# Patient Record
Sex: FEMALE | Race: WHITE | NOT HISPANIC OR LATINO | URBAN - METROPOLITAN AREA
[De-identification: names, ages, dates, MRNs, and addresses within clinical notes are randomized per-mention and may not be internally consistent; named-entity substitution may affect disease eponyms.]

---

## 2017-01-13 ENCOUNTER — IMPORTED ENCOUNTER (OUTPATIENT)
Dept: URBAN - METROPOLITAN AREA CLINIC 31 | Facility: CLINIC | Age: 72
End: 2017-01-13

## 2017-01-13 PROBLEM — Z96.1: Noted: 2017-01-13

## 2017-01-13 PROCEDURE — 92014 COMPRE OPH EXAM EST PT 1/>: CPT

## 2017-01-13 PROCEDURE — 92015 DETERMINE REFRACTIVE STATE: CPT

## 2017-01-13 NOTE — PATIENT DISCUSSION
1.  Pseudophakia OU - Restor MF  IOLs. stable. Monitor. 2. Fam hx AMD and GLC-mom. 3. Getting rx suns dist- VSP4. RTN 1 yr [employer] in [facility].

## 2017-02-13 ENCOUNTER — APPOINTMENT (RX ONLY)
Dept: URBAN - METROPOLITAN AREA CLINIC 126 | Facility: CLINIC | Age: 72
Setting detail: DERMATOLOGY
End: 2017-02-13

## 2017-02-13 DIAGNOSIS — H01.13 ECZEMATOUS DERMATITIS OF EYELID: ICD-10-CM

## 2017-02-13 DIAGNOSIS — B07.8 OTHER VIRAL WARTS: ICD-10-CM

## 2017-02-13 PROBLEM — H01.139 ECZEMATOUS DERMATITIS OF UNSPECIFIED EYE, UNSPECIFIED EYELID: Status: ACTIVE | Noted: 2017-02-13

## 2017-02-13 PROBLEM — H01.134 ECZEMATOUS DERMATITIS OF LEFT UPPER EYELID: Status: ACTIVE | Noted: 2017-02-13

## 2017-02-13 PROCEDURE — ? DIAGNOSIS COMMENT

## 2017-02-13 PROCEDURE — ? PRESCRIPTION

## 2017-02-13 PROCEDURE — ? TREATMENT REGIMEN

## 2017-02-13 PROCEDURE — 99213 OFFICE O/P EST LOW 20 MIN: CPT

## 2017-02-13 PROCEDURE — ? DEFER

## 2017-02-13 PROCEDURE — ? COUNSELING

## 2017-02-13 RX ORDER — DESONIDE 0.5 MG/G
CREAM TOPICAL
Qty: 1 | Refills: 0 | Status: ERX | COMMUNITY
Start: 2017-02-13

## 2017-02-13 RX ADMIN — DESONIDE: 0.5 CREAM TOPICAL at 16:16

## 2017-02-13 ASSESSMENT — LOCATION DETAILED DESCRIPTION DERM
LOCATION DETAILED: LEFT CENTRAL MALAR CHEEK
LOCATION DETAILED: LEFT MEDIAL SUPERIOR EYELID
LOCATION DETAILED: LEFT INFERIOR TEMPLE

## 2017-02-13 ASSESSMENT — LOCATION ZONE DERM
LOCATION ZONE: FACE
LOCATION ZONE: EYELID

## 2017-02-13 ASSESSMENT — LOCATION SIMPLE DESCRIPTION DERM
LOCATION SIMPLE: LEFT SUPERIOR EYELID
LOCATION SIMPLE: LEFT CHEEK
LOCATION SIMPLE: LEFT TEMPLE

## 2017-02-13 NOTE — PROCEDURE: MIPS QUALITY
Quality 47: Advance Care Plan: Advance care planning not documented, reason not otherwise specified.
Quality 110: Preventive Care And Screening: Influenza Immunization: Influenza Immunization Administered during Influenza season
Quality 111:Pneumonia Vaccination Status For Older Adults: Pneumococcal Vaccination Previously Received
Detail Level: Detailed
Quality 226: Preventive Care And Screening: Tobacco Use: Screening And Cessation Intervention: Patient screened for tobacco and never smoked

## 2017-02-13 NOTE — PROCEDURE: TREATMENT REGIMEN
Initiate Treatment: Dermasorb cream BID x 3-4 days , Desonide prescription given BID x 3-4 days when dermatitis flares
Detail Level: Simple

## 2017-02-13 NOTE — HPI: SKIN LESION
How Severe Is Your Skin Lesion?: mild
Has Your Skin Lesion Been Treated?: been treated
Is This A New Presentation, Or A Follow-Up?: Skin Lesion
Has Your Skin Lesion Been Treated?: not been treated
Is This A New Presentation, Or A Follow-Up?: Growths

## 2017-02-15 ENCOUNTER — RX ONLY (OUTPATIENT)
Age: 72
Setting detail: RX ONLY
End: 2017-02-15

## 2017-02-15 RX ORDER — TACROLIMUS 1 MG/G
OINTMENT TOPICAL
Qty: 1 | Refills: 1 | Status: ERX | COMMUNITY
Start: 2017-02-15

## 2017-03-06 ENCOUNTER — APPOINTMENT (RX ONLY)
Dept: URBAN - METROPOLITAN AREA CLINIC 126 | Facility: CLINIC | Age: 72
Setting detail: DERMATOLOGY
End: 2017-03-06

## 2017-03-06 DIAGNOSIS — L82.0 INFLAMED SEBORRHEIC KERATOSIS: ICD-10-CM

## 2017-03-06 DIAGNOSIS — H01.13 ECZEMATOUS DERMATITIS OF EYELID: ICD-10-CM | Status: RESOLVED

## 2017-03-06 PROBLEM — H01.139 ECZEMATOUS DERMATITIS OF UNSPECIFIED EYE, UNSPECIFIED EYELID: Status: ACTIVE | Noted: 2017-03-06

## 2017-03-06 PROCEDURE — 17110 DESTRUCTION B9 LES UP TO 14: CPT

## 2017-03-06 PROCEDURE — ? COUNSELING

## 2017-03-06 PROCEDURE — 99212 OFFICE O/P EST SF 10 MIN: CPT | Mod: 25

## 2017-03-06 PROCEDURE — ? TREATMENT REGIMEN

## 2017-03-06 PROCEDURE — ? BENIGN DESTRUCTION

## 2017-03-06 ASSESSMENT — LOCATION SIMPLE DESCRIPTION DERM
LOCATION SIMPLE: LEFT TEMPLE
LOCATION SIMPLE: LEFT CHEEK
LOCATION SIMPLE: LEFT EYELID

## 2017-03-06 ASSESSMENT — LOCATION DETAILED DESCRIPTION DERM
LOCATION DETAILED: LEFT SUPERIOR CENTRAL MALAR CHEEK
LOCATION DETAILED: LEFT MID TEMPLE
LOCATION DETAILED: LEFT LATERAL CANTHUS

## 2017-03-06 ASSESSMENT — LOCATION ZONE DERM
LOCATION ZONE: EYELID
LOCATION ZONE: FACE

## 2017-03-06 NOTE — PROCEDURE: BENIGN DESTRUCTION
Treatment Number (Will Not Render If 0): 0
Medical Necessity Clause: This procedure was medically necessary because the lesions that were treated were:
Anesthesia Volume In Cc: 0.5
Medical Necessity Information: It is in your best interest to select a reason for this procedure from the list below. All of these items fulfill various CMS LCD requirements except the new and changing color options.
Post-Care Instructions: I reviewed with the patient in detail post-care instructions. Patient is to wear sunprotection, and avoid picking at any of the treated lesions. Pt may apply Vaseline to crusted or scabbing areas.
Include Z78.9 (Other Specified Conditions Influencing Health Status) As An Associated Diagnosis?: No
Detail Level: Detailed
Consent: The patient's consent was obtained including but not limited to risks of crusting, scabbing, blistering, scarring, darker or lighter pigmentary change, recurrence, incomplete removal and infection.

## 2017-03-06 NOTE — PROCEDURE: TREATMENT REGIMEN
Continue Regimen: Desonide cream or cortisone cream BID x 3-4 days if irritation flares again
Detail Level: Simple

## 2017-03-08 ENCOUNTER — IMPORTED ENCOUNTER (OUTPATIENT)
Dept: URBAN - METROPOLITAN AREA CLINIC 31 | Facility: CLINIC | Age: 72
End: 2017-03-08

## 2017-03-08 PROBLEM — H11.422: Noted: 2017-03-08

## 2017-03-08 PROCEDURE — 99214 OFFICE O/P EST MOD 30 MIN: CPT

## 2017-03-08 NOTE — PATIENT DISCUSSION
1.  OS conj edema from nitro on skin around eye. Vision is blurry from using vaseline on skin near eye and getting in.  DC using vaseline close to eye and can use refresh tears qid for next few days. call if any problems.  2. RTN 1/18 CE

## 2018-01-15 ENCOUNTER — IMPORTED ENCOUNTER (OUTPATIENT)
Dept: URBAN - METROPOLITAN AREA CLINIC 31 | Facility: CLINIC | Age: 73
End: 2018-01-15

## 2018-01-15 PROBLEM — Z96.1: Noted: 2018-01-15

## 2018-01-15 PROCEDURE — 92015 DETERMINE REFRACTIVE STATE: CPT

## 2018-01-15 PROCEDURE — 92014 COMPRE OPH EXAM EST PT 1/>: CPT

## 2018-01-15 NOTE — PATIENT DISCUSSION
1.  Pseudophakia OU - Restor MF  IOLs. OD capsule early haze. Os capsule clear. Pt would like to have her capsule checked before leaving to go [place] [address] in April 2. Fam hx AMD and GLC-mom. 3. Getting rx suns dist- VSP4. RTN April 2018 to eval Capsule OD. 5. RTN 1 yr [employer] in [facility].

## 2018-04-23 ENCOUNTER — IMPORTED ENCOUNTER (OUTPATIENT)
Dept: URBAN - METROPOLITAN AREA CLINIC 31 | Facility: CLINIC | Age: 73
End: 2018-04-23

## 2018-04-23 PROCEDURE — 99214 OFFICE O/P EST MOD 30 MIN: CPT

## 2018-04-23 NOTE — PATIENT DISCUSSION
1.  Pseudophakia OU - Restor MF  IOLs. OD capsule early haze. Os capsule clear. Pt would like to have her capsule checked  2. Fam hx AMD and GLC-mom. 3. Getting rx suns dist- VSP4. RTN Nov 2018- eval Capsule OD. 5. RTN 1 yr [employer] in [facility].

## 2018-04-24 PROBLEM — Z96.1: Noted: 2018-04-24

## 2018-11-30 ENCOUNTER — IMPORTED ENCOUNTER (OUTPATIENT)
Dept: URBAN - METROPOLITAN AREA CLINIC 31 | Facility: CLINIC | Age: 73
End: 2018-11-30

## 2018-11-30 PROCEDURE — 92015 DETERMINE REFRACTIVE STATE: CPT

## 2018-11-30 PROCEDURE — 92014 COMPRE OPH EXAM EST PT 1/>: CPT

## 2018-11-30 NOTE — PATIENT DISCUSSION
PCO  OD (Posterior Capsule Opacification)   PCO is visually significant and impairment of vision does not meet the patient’s functional needs or interferes with activities of daily living. Risks benefits and alternatives to the Nd:YAG Laser reviewed including elevated IOP immediately postop and retinal tear/detachment. Patient to notify their ophthalmologist promptly if they have a significant change in symptoms such as flashes of light (photopsia) an increase in floaters loss of visual field or decrease in visual acuity after the procedure. Patient will be scheduled in [place] [id number] for Nd:YAG Laser.

## 2018-11-30 NOTE — PATIENT DISCUSSION
1.  Pseudophakia OU - Restor MF  IOLs. OD capsule haze. Os capsule clear. Consult for Yag OD. 2.  Fam hx AMD and GLC-mom. 3. May need rx suns dist- VSP4. Refer for Yag consult OD-5. RTN 1 yr [facility] in [facility].

## 2018-12-01 PROBLEM — H26.491: Noted: 2018-12-01

## 2018-12-01 PROBLEM — Z96.1: Noted: 2018-11-30

## 2018-12-11 ENCOUNTER — IMPORTED ENCOUNTER (OUTPATIENT)
Dept: URBAN - METROPOLITAN AREA CLINIC 31 | Facility: CLINIC | Age: 73
End: 2018-12-11

## 2018-12-11 PROBLEM — H26.491: Noted: 2018-12-11

## 2018-12-11 PROBLEM — Z96.1: Noted: 2018-12-11

## 2018-12-11 PROCEDURE — 92134 CPTRZ OPH DX IMG PST SGM RTA: CPT

## 2018-12-11 PROCEDURE — 99214 OFFICE O/P EST MOD 30 MIN: CPT

## 2018-12-11 NOTE — PATIENT DISCUSSION
PCO  OD (Posterior Capsule Opacification)   PCO is visually significant and impairment of vision does not meet the patient’s functional needs or interferes with activities of daily living. Risks benefits and alternatives to the Nd:YAG Laser reviewed including elevated IOP immediately postop and retinal tear/detachment. Patient to notify their ophthalmologist promptly if they have a significant change in symptoms such as flashes of light (photopsia) an increase in floaters loss of visual field or decrease in visual acuity after the procedure. [facility] [place] [facility] [place] [facility] [place] [id number] [place] Nd[place]AG [facility]. OD only.  To see PJO a week after Discussed the need for glasses in order to improve vision due to refractive error

## 2018-12-18 ENCOUNTER — IMPORTED ENCOUNTER (OUTPATIENT)
Dept: URBAN - METROPOLITAN AREA CLINIC 31 | Facility: CLINIC | Age: 73
End: 2018-12-18

## 2019-01-15 ENCOUNTER — IMPORTED ENCOUNTER (OUTPATIENT)
Dept: URBAN - METROPOLITAN AREA CLINIC 31 | Facility: CLINIC | Age: 74
End: 2019-01-15

## 2019-01-15 PROBLEM — Z96.1: Noted: 2019-01-15

## 2019-01-15 PROCEDURE — 99024 POSTOP FOLLOW-UP VISIT: CPT

## 2019-01-15 NOTE — PATIENT DISCUSSION
1.  Pseudophakia OU - Restor MF  IOLs. OD open. Os capsule clear. Pt does not feel the yag helped improve her right eye. Pt needs rx to improve dist.   2.  Fam hx AMD and GLC-mom. 3. Get dist rx in suns. - VSP4. RTN 5/19 DFTA after OD yagHad Amberly sx in [facility].

## 2019-04-30 ENCOUNTER — IMPORTED ENCOUNTER (OUTPATIENT)
Dept: URBAN - METROPOLITAN AREA CLINIC 31 | Facility: CLINIC | Age: 74
End: 2019-04-30

## 2019-04-30 PROBLEM — Z96.1: Noted: 2019-04-30

## 2019-04-30 PROCEDURE — 99214 OFFICE O/P EST MOD 30 MIN: CPT

## 2019-04-30 NOTE — PATIENT DISCUSSION
1.  Pseudophakia OU - Restor MF  IOLs. OD open. Os capsule clear. Pt does not feel the yag helped improve her right eye. Pt needs rx to improve dist.   2.  Fam hx AMD and GLC-mom. 3. Get dist rx in suns. - VSP4. RTN  12/19 CE Had Bunion sx in [facility].

## 2019-12-02 ENCOUNTER — IMPORTED ENCOUNTER (OUTPATIENT)
Dept: URBAN - METROPOLITAN AREA CLINIC 31 | Facility: CLINIC | Age: 74
End: 2019-12-02

## 2019-12-02 PROBLEM — Z96.1: Noted: 2019-12-02

## 2019-12-02 PROCEDURE — 92014 COMPRE OPH EXAM EST PT 1/>: CPT

## 2019-12-02 PROCEDURE — 92015 DETERMINE REFRACTIVE STATE: CPT

## 2019-12-02 NOTE — PATIENT DISCUSSION
1.  Pseudophakia OU - Restor MF  IOLs. OD open. Os capsule clear. Pt does not feel the yag helped improve her right eye. Pt needs rx to improve dist.   2.  Fam hx AMD and GLC-mom. 3. Get dist rx in suns. - VSP4. RTN  12/19 CE Had Bunion sx in [facility].

## 2021-03-22 ENCOUNTER — IMPORTED ENCOUNTER (OUTPATIENT)
Dept: URBAN - METROPOLITAN AREA CLINIC 31 | Facility: CLINIC | Age: 76
End: 2021-03-22

## 2021-03-22 PROBLEM — H04.123: Noted: 2021-03-22

## 2021-03-22 PROBLEM — Z96.1: Noted: 2021-03-22

## 2021-03-22 PROCEDURE — 92014 COMPRE OPH EXAM EST PT 1/>: CPT

## 2021-03-22 PROCEDURE — 92015 DETERMINE REFRACTIVE STATE: CPT

## 2021-03-22 NOTE — PATIENT DISCUSSION
1.  Pseudophakia OU - Restor MF  IOLs. OD open. Os capsule clear. Pt does not feel the yag helped improve her right eye. Pt needs rx to improve dist.   2.  Fam hx AMD and GLC-mom. 3. Get dist rx in suns. - VSP4. RTN  12/19 CE Had Bunion sx in [facility].

## 2021-03-22 NOTE — PATIENT DISCUSSION
1.  Dry Eyes OU:  Start artificials tears qid khari on computers. Encouraged regular use. 2.  Pseudophakia OU - Restor MF  IOLs. OD open. Os capsule clear. Pt does not feel the yag helped improve her right eye. Pt needs rx to improve dist.   3.  Fam hx AMD and GLC-mom. 4. Get dist rx in suns. - VSP5. RTN  1 mtyhs  FU on dryness--pt can cxl if better6. RTN  3/22 CE Had Amberly sx in [facility].

## 2022-03-22 ENCOUNTER — IMPORTED ENCOUNTER (OUTPATIENT)
Dept: URBAN - METROPOLITAN AREA CLINIC 31 | Facility: CLINIC | Age: 77
End: 2022-03-22

## 2022-03-22 PROBLEM — Z96.1: Noted: 2022-03-22

## 2022-03-22 PROBLEM — H04.123: Noted: 2022-03-22

## 2022-03-22 NOTE — PATIENT DISCUSSION
1.  Dry Eyes OU:  Start artificials tears qid khari on computers. Encouraged regular use. 2.  Pseudophakia OU - Restor MF  IOLs. OD open. Os capsule clear. Pt does not feel the yag helped improve her right eye. Pt needs rx to improve dist.   3.  Fam hx AMD and GLC-mom. 4. Get dist rx in suns. - VSP5. RTN  1 mtyhs  FU on dryness--pt can cxl if better6. RTN  3/22 CE Had Amberly sx in [facility].

## 2022-03-25 ENCOUNTER — IMPORTED ENCOUNTER (OUTPATIENT)
Dept: URBAN - METROPOLITAN AREA CLINIC 31 | Facility: CLINIC | Age: 77
End: 2022-03-25

## 2022-03-25 PROBLEM — H04.123: Noted: 2022-03-25

## 2022-03-25 PROBLEM — Z96.1: Noted: 2022-03-25

## 2022-03-25 PROCEDURE — 92015 DETERMINE REFRACTIVE STATE: CPT

## 2022-03-25 PROCEDURE — 99214 OFFICE O/P EST MOD 30 MIN: CPT

## 2022-03-25 NOTE — PATIENT DISCUSSION
1.  Dry Eyes OU:  Start artificials tears qid khari on computers. Encouraged regular use. 2.  Pseudophakia OU - Restor MF  IOLs. OD open. Os capsule clear. Pt does not feel the yag helped improve her right eye. Pt needs rx to improve dist.   3.  Fam hx AMD and GLC-mom. 4. Get dist rx in suns. - VSP5. RTN  1 mtyhs  FU on dryness--pt can cxl if better6. RTN  3/22 CE Had Amberly mcintosh in [facility].

## 2022-04-02 ASSESSMENT — VISUAL ACUITY
OU_CC: 20/40
OS_SC: 20/30-1
OD_CC: 20/40-2
OS_CC: 20/30
OS_CC: 20/30
OD_CC: 20/60-1
OU_CC: 20/30
OD_SC: 20/40-2
OS_SC: 20/40-1
OD_CC: 20/30
OD_CC: 20/50
OS_CC: 20/50-1
OD_SC: 20/30-2
OU_SC: 20/25
OS_CC: 20/40
OS_CC: 20/40-2
OD_CC: 20/40-1
OS_SC: 20/30
OD_PH: SC 20/40 -2
OS_CC: 20/40+2
OD_CC: 20/40+1
OS_SC: 20/40-2
OS_SC: 20/40
OS_CC: 20/40-2
OS_SC: 20/25-3
OS_CC: 20/40+1
OD_SC: 20/25
OD_CC: 20/50-1
OD_SC: 20/40+2
OU_SC: 20/40
OD_SC: 20/30-2
OS_CC: 20/30-2
OD_PH: SC 20/40 -2
OD_SC: 20/30
OS_SC: 20/40+2
OS_PH: SC 20/40 +2
OD_CC: 20/60-2
OD_SC: 20/40
OD_CC: 20/50
OS_CC: 20/40+1
OD_CC: 20/60+2
OS_CC: 20/30-2
OD_PH: SC 20/30 +2
OD_CC: 20/30-2

## 2022-04-02 ASSESSMENT — TONOMETRY
OS_IOP_MMHG: 14
OD_IOP_MMHG: 14
OD_IOP_MMHG: 15
OS_IOP_MMHG: 14
OS_IOP_MMHG: 13
OS_IOP_MMHG: 14
OS_IOP_MMHG: 16
OS_IOP_MMHG: 15
OD_IOP_MMHG: 13
OD_IOP_MMHG: 15
OD_IOP_MMHG: 13
OD_IOP_MMHG: 14
OS_IOP_MMHG: 14
OD_IOP_MMHG: 13
OD_IOP_MMHG: 16
OD_IOP_MMHG: 16

## 2024-03-22 ENCOUNTER — COMPREHENSIVE EXAM (OUTPATIENT)
Dept: URBAN - METROPOLITAN AREA CLINIC 34 | Facility: CLINIC | Age: 79
End: 2024-03-22

## 2024-03-22 DIAGNOSIS — H04.123: ICD-10-CM

## 2024-03-22 DIAGNOSIS — Z96.1: ICD-10-CM

## 2024-03-22 PROCEDURE — 92015 DETERMINE REFRACTIVE STATE: CPT

## 2024-03-22 PROCEDURE — 92014 COMPRE OPH EXAM EST PT 1/>: CPT

## 2024-03-22 ASSESSMENT — TONOMETRY
OS_IOP_MMHG: 15
OD_IOP_MMHG: 15

## 2024-03-22 ASSESSMENT — VISUAL ACUITY
OS_SC: 20/40+1
OD_SC: 20/30-1
OD_SC: 20/25+1
OS_SC: 20/30+1

## 2025-03-24 ENCOUNTER — COMPREHENSIVE EXAM (OUTPATIENT)
Age: 80
End: 2025-03-24

## 2025-03-24 DIAGNOSIS — Z96.1: ICD-10-CM

## 2025-03-24 DIAGNOSIS — H52.4: ICD-10-CM

## 2025-03-24 DIAGNOSIS — H04.123: ICD-10-CM

## 2025-03-24 PROCEDURE — 92015 DETERMINE REFRACTIVE STATE: CPT

## 2025-03-24 PROCEDURE — 92014 COMPRE OPH EXAM EST PT 1/>: CPT
